# Patient Record
Sex: MALE | Race: WHITE | URBAN - METROPOLITAN AREA
[De-identification: names, ages, dates, MRNs, and addresses within clinical notes are randomized per-mention and may not be internally consistent; named-entity substitution may affect disease eponyms.]

---

## 2018-04-12 DIAGNOSIS — N50.0 ATROPHY OF TESTIS: ICD-10-CM

## 2018-04-12 DIAGNOSIS — R68.82 DECREASED SEX DRIVE: Primary | ICD-10-CM

## 2018-04-16 ENCOUNTER — CARE COORDINATION (OUTPATIENT)
Dept: UROLOGY | Facility: CLINIC | Age: 38
End: 2018-04-16

## 2018-04-16 NOTE — PROGRESS NOTES
Left message for patient to have labs done prior to coming to his appointment  Orders placed  Amy Gutierrez RN   Care Coordinator Urology

## 2018-04-30 ENCOUNTER — PRE VISIT (OUTPATIENT)
Dept: UROLOGY | Facility: CLINIC | Age: 38
End: 2018-04-30

## 2018-04-30 NOTE — TELEPHONE ENCOUNTER
MEDICAL RECORDS REQUEST   [facility]  [address] [place] [address]  PHONE: [phone]  Fax: [phone]        FUTURE VISIT INFORMATION                                                   Conner Boswell : 1980 scheduled for future visit at [facility]    APPOINTMENT INFORMATION:    Date: 2018    Provider:  CONNER MESSINA    Reason for Visit/Diagnosis: ED    REFERRAL INFORMATION:    Referring provider:  SELF    Specialty: SELF    Referring providers clinic:  SELF    Clinic contact number:  SELF    RECORDS REQUESTED FOR VISIT                                                     NOTES  STATUS/DETAILS   OFFICE NOTE from referring provider  no   OFFICE NOTE from other specialist  no   DISCHARGE SUMMARY from hospital  no   DISCHARGE REPORT from the ER  no   OPERATIVE REPORT  no   MEDICATION LIST  no   LABS         PRE-VISIT CHECKLIST      Record collection complete Yes PER PATIENT NO OUTSIDE RECORDS.. CDK   Appointment appropriately scheduled           (right time/right provider) Yes   Joint diagnostic appointment coordinated correctly          (ensure right order & amount of time) Yes   MyChart activation If no, please explain IN PROCESS   Questionnaire complete If no, please explain IN PROCESS     Completed by: Arlene Kimbrough

## 2018-05-31 ENCOUNTER — PRE VISIT (OUTPATIENT)
Dept: UROLOGY | Facility: CLINIC | Age: 38
End: 2018-05-31

## 2018-05-31 NOTE — TELEPHONE ENCOUNTER
"Patient coming in for \"low testosterone/shrunken testicles/penis\" consult with Dr. Fabian. Patient to get labs prior.  "

## 2018-06-01 ENCOUNTER — TELEPHONE (OUTPATIENT)
Dept: UROLOGY | Facility: CLINIC | Age: 38
End: 2018-06-01

## 2018-06-01 NOTE — TELEPHONE ENCOUNTER
----- Message from Aury Novoa sent at 6/1/2018  8:10 AM CDT -----  Regarding: RE: casey banks  I made the lab appt and mailed it out  ----- Message -----     From: Alisha Engle LPN     Sent: 5/31/2018   9:05 AM       To: [facility]ordin[facility]  Subject: casey banks                                     Hi!    Can you try get a hold of this patient to have him do labs prior to seeing Dr. Banks. It has been attempted but maybe you could send out a letter if he doesn't answer?    Thank you so much!!!!    -Alisha BLACKBURN